# Patient Record
Sex: MALE | Race: BLACK OR AFRICAN AMERICAN | Employment: UNEMPLOYED | URBAN - METROPOLITAN AREA
[De-identification: names, ages, dates, MRNs, and addresses within clinical notes are randomized per-mention and may not be internally consistent; named-entity substitution may affect disease eponyms.]

---

## 2017-07-26 ENCOUNTER — TELEPHONE (OUTPATIENT)
Dept: SLEEP MEDICINE | Facility: CLINIC | Age: 32
End: 2017-07-26

## 2017-07-26 DIAGNOSIS — G47.33 OBSTRUCTIVE SLEEP APNEA: Primary | ICD-10-CM

## 2017-07-26 NOTE — LETTER
7/26/2017        RE: Jarred Daniel Jr.  [address]  [address]  [place] [facility] [address]      Dr. Tubbs,    Patient called because he was seen at [facility] for sleep consult last week and recommended to have a sleep study but would like it done in [facility] system.  Reviewed consult notes from Dr. Tubbs:    ASSESSMENT:  A 31-year-old with anxiety disorder who presents with symptoms of difficulty in falling asleep, staying asleep, non-refreshing sleep, daytime fatigue, and sleepiness, loud snoring, rare episodes of gasping and elevation in the body mass index. His [place] Sleepiness Score is elevated.  1.Chronic insomnia. Patient's symptoms are likely contributed by anxiety, as well as untreated sleep apnea.  2.Obstructive sleep apnea. This is likely in this patient with symptoms, which include difficulty in maintaining sleep, snoring, daytime fatigue and sleepiness and an elevation in his body mass index.  3.Vitamin D deficiency, currently on supplementation, improving.  4.Obesity, which is a risk factor for untreated sleep apnea.    PLAN:  1.I have discussed with him the diagnosis of chronic insomnia and also obstructive sleep apnea.  2.He has an appointment to see Merna Smith [facility] for cognitive behavioral therapy for both anxiety and insomnia.  3.I do feel that patient requires further diagnostic testing consisting of an overnight polysomnogram. This was schedule and patient is aware that he might be placed on the CPAP during the course of the recording.  4.After the sleep study is completed and careful review I will go ahead and prescribe him a CPAP device if he qualifies.  5.He also feels that he is going to make some attempts to lose weight by modifying his diet and exercise routine.     Based on Dr. Tubbs's notes, I have a high suspicion for WASHINGTON based on presence of snoring, snort arousals, witnessed apneas, and obesity with excessive daytime sleepiness.  Per notes, he is interested  in treatment if determined to be medically necessary.  We discussed consequences of untreated WASHINGTON. Patient is interested in treatment and willing to undergo overnight sleep testing.  Home sleep testing is inappropriate for this patient due to severe and chronic insomnia.  Study has been ordered today.      Route study and note to Dr. Daniel Tubbs.    Chico Hurley MD, Sleep Physician  Jul 26, 2017       Sincerely,        Chico Hurley MD

## 2017-07-26 NOTE — TELEPHONE ENCOUNTER
Patient called because he was seen at [facility] for sleep consult last week and recommended to have a sleep study but would like it done in [facility] system.  Reviewed consult notes from Dr. Tubbs:    ASSESSMENT:  A 31-year-old with anxiety disorder who presents with symptoms of difficulty in falling asleep, staying asleep, non-refreshing sleep, daytime fatigue, and sleepiness, loud snoring, rare episodes of gasping and elevation in the body mass index. His [place] Sleepiness Score is elevated.  1.Chronic insomnia. Patient's symptoms are likely contributed by anxiety, as well as untreated sleep apnea.  2.Obstructive sleep apnea. This is likely in this patient with symptoms, which include difficulty in maintaining sleep, snoring, daytime fatigue and sleepiness and an elevation in his body mass index.  3.Vitamin D deficiency, currently on supplementation, improving.  4.Obesity, which is a risk factor for untreated sleep apnea.    PLAN:  1.I have discussed with him the diagnosis of chronic insomnia and also obstructive sleep apnea.  2.He has an appointment to see JULIA Dumas for cognitive behavioral therapy for both anxiety and insomnia.  3.I do feel that patient requires further diagnostic testing consisting of an overnight polysomnogram. This was schedule and patient is aware that he might be placed on the CPAP during the course of the recording.  4.After the sleep study is completed and careful review I will go ahead and prescribe him a CPAP device if he qualifies.  5.He also feels that he is going to make some attempts to lose weight by modifying his diet and exercise routine.     Based on Dr. Tubbs's notes, I have a high suspicion for WASHINGTON based on presence of snoring, snort arousals, witnessed apneas, and obesity with excessive daytime sleepiness.  Per notes, he is interested in treatment if determined to be medically necessary.  We discussed consequences of untreated WASHINGTON. Patient is interested  in treatment and willing to undergo overnight sleep testing.  Home sleep testing is inappropriate for this patient due to severe and chronic insomnia.  Study has been ordered today.      Route study and note to Dr. Daniel Tubbs.    Chico Hurley MD, Sleep Physician  Jul 26, 2017

## 2017-08-10 ENCOUNTER — OFFICE VISIT (OUTPATIENT)
Dept: NURSING | Facility: CLINIC | Age: 32
End: 2017-08-10
Payer: COMMERCIAL

## 2017-08-10 DIAGNOSIS — R06.02 SHORTNESS OF BREATH: ICD-10-CM

## 2017-08-10 DIAGNOSIS — G47.9 SLEEP DISORDER: Primary | ICD-10-CM

## 2017-08-10 LAB
% O2SAT: ABNORMAL % (ref 92–100)
BASE EXCESS BLDA CALC-SCNC: 2 MMOL/L (ref -9–1.8)
FIO2: ABNORMAL
HCO3 BLDA-SCNC: 26.8 MMOL/L (ref 21–28)
PCO2 BLDA: 42.1 MMHG (ref 35–45)
PH BLDA: 7.41 [PH] (ref 7.35–7.45)
PO2 BLD: 82 MMHG (ref 80–105)

## 2017-08-10 PROCEDURE — 36600 WITHDRAWAL OF ARTERIAL BLOOD: CPT | Performed by: INTERNAL MEDICINE

## 2017-08-10 PROCEDURE — 82805 BLOOD GASES W/O2 SATURATION: CPT | Performed by: INTERNAL MEDICINE

## 2017-08-10 NOTE — MR AVS SNAPSHOT
After Visit Summary   8/10/2017    Jarred Daniel Jr.    MRN: [id number]           Patient Information     Date Of Birth          1985        Visit Information        Provider Department      8/10/2017 4:00 PM PFT LAB [facility]        Today's Diagnoses     Sleep disorder    -  1    Shortness of breath           Follow-ups after your visit        Your next 10 appointments already scheduled     Aug 12, 2017  8:00 PM CDT   PSG Split with BK BED 1   [facility] ([facility])    [address] [place] [id number] [phone]              Who to contact     If you have questions or need follow up information about today's clinic visit or your schedule please contact [facility] directly at [phone].  Normal or non-critical lab and imaging results will be communicated to you by MyChart, letter or phone within 4 business days after the clinic has received the results. If you do not hear from us within 7 days, please contact the clinic through MyChart or phone. If you have a critical or abnormal lab result, we will notify you by phone as soon as possible.  Submit refill requests through [facility] or call your pharmacy and they will forward the refill request to us. Please allow 3 business days for your refill to be completed.          Additional Information About Your Visit        SABIAharPlatypi Information     [facility] is an electronic gateway that provides easy, online access to your medical records. With [facility], you can request a clinic appointment, read your test results, renew a prescription or communicate with your care team.     To sign up for [facility] visit the website at [email or web address]   You will be asked to enter the access code listed below, as well as some personal information. Please follow the directions to create your username and password.     Your access code is:  [email or web address]  Expires: 2017  5:04 PM     Your access code will  in 90 days. If you need help or a new code, please contact your [facility] or call [phone] for assistance.        Care EveryWhere ID     This is your Care EveryWhere ID. This could be used by other organizations to access your [place] medical records  [id number]         Blood Pressure from Last 3 Encounters:   13 132/89    Weight from Last 3 Encounters:   13 117.9 kg (260 lb)              We Performed the Following     Blood gas arterial and oxyhgb     General PFT Lab (Please always keep checked)        Primary Care Provider    Physician No Ref-Primary       No address on file        Equal Access to Services     PAULA MARC : Jessica Aguilar, wahernánda laronadaha, qaybta kaalmada adeadalyada, jessica fuchs . So [facility] [phone].    ATENCIÓN: Si habla español, tiene a cobos disposición servicios gratuitos de asistencia lingüística. Llame al [phone].    We comply with applicable federal civil rights laws and [place] laws. We do not discriminate on the basis of race, color, national origin, age, disability sex, sexual orientation or gender identity.            Thank you!     Thank you for choosing [facility]  for your care. Our goal is always to provide you with excellent care. Hearing back from our patients is one way we can continue to improve our services. Please take a few minutes to complete the written survey that you may receive in the mail after your visit with us. Thank you!             Your Updated Medication List - Protect others around you: Learn how to safely use, store and throw away your medicines at www.disposemymeds.org.          This list is accurate as of: 8/10/17  5:04 PM.  Always use your most recent med list.                   Brand Name Dispense Instructions for use Diagnosis    * risperiDONE 0.5 MG tablet     risperDAL    30 tablet    Take 1 tablet by mouth At Bedtime.    Psychosis       * risperiDONE 0.5 MG tablet    risperDAL    30 tablet    Take 1 tablet by mouth daily.    Psychosis       * Notice:  This list has 2 medication(s) that are the same as other medications prescribed for you. Read the directions carefully, and ask your doctor or other care provider to review them with you.

## 2017-08-10 NOTE — PROGRESS NOTES
ABG: Obtained ABG from R Radial after performing a positive Navneet's test. Blood was drawn on RA SPO2 at time of draw 98% RA.    Patient requested spirometry test stating that his PCP was concerned about his breathing. Completed an [facility].  No charge for the [facility].

## 2017-08-11 LAB
EXPTIME-PRE: 6.17 SEC
FEF2575-%PRED-PRE: 76 %
FEF2575-PRE: 3.63 L/SEC
FEF2575-PRED: 4.76 L/SEC
FEFMAX-%PRED-PRE: 94 %
FEFMAX-PRE: 10.2 L/SEC
FEFMAX-PRED: 10.82 L/SEC
FEV1-%PRED-PRE: 95 %
FEV1-PRE: 4.6 L
FEV1FEV6-PRE: 74 %
FEV1FEV6-PRED: 83 %
FEV1FVC-PRE: 74 %
FEV1FVC-PRED: 82 %
FIFMAX-PRE: 6.64 L/SEC
FVC-%PRED-PRE: 106 %
FVC-PRE: 6.25 L
FVC-PRED: 5.88 L

## 2017-08-12 ENCOUNTER — THERAPY VISIT (OUTPATIENT)
Dept: SLEEP MEDICINE | Facility: CLINIC | Age: 32
End: 2017-08-12
Payer: COMMERCIAL

## 2017-08-12 DIAGNOSIS — G47.33 OBSTRUCTIVE SLEEP APNEA: ICD-10-CM

## 2017-08-12 PROCEDURE — 95810 POLYSOM 6/> YRS 4/> PARAM: CPT | Performed by: INTERNAL MEDICINE

## 2017-08-12 NOTE — MR AVS SNAPSHOT
After Visit Summary   8/12/2017    Jarred Daniel Jr.    MRN: [id number]           Patient Information     Date Of Birth          1985        Visit Information        Provider Department      8/12/2017 8:00 PM BK BED 1 [facility]        Today's Diagnoses     Obstructive sleep apnea           Follow-ups after your visit        Who to contact     If you have questions or need follow up information about today's clinic visit or your schedule please contact [facility] directly at [phone].  Normal or non-critical lab and imaging results will be communicated to you by [employer], letter or phone within 4 business days after the clinic has received the results. If you do not hear from us within 7 days, please contact the clinic through [employer] or phone. If you have a critical or abnormal lab result, we will notify you by phone as soon as possible.  Submit refill requests through [facility] or call your pharmacy and they will forward the refill request to us. Please allow 3 business days for your refill to be completed.          Additional Information About Your Visit        MyChart Information     [facility] gives you secure access to your electronic health record. If you see a primary care provider, you can also send messages to your care team and make appointments. If you have questions, please call your primary care clinic.  If you do not have a primary care provider, please call [phone] and they will assist you.        Care EveryWhere ID     This is your Care EveryWhere ID. This could be used by other organizations to access your [place] medical records  [id number]         Blood Pressure from Last 3 Encounters:   04/18/13 132/89    Weight from Last 3 Encounters:   04/18/13 117.9 kg (260 lb)              We Performed the Following     Comprehensive Sleep Study        Primary Care Provider    Physician No Ref-Primary       No address on file        Equal Access to  Services     [facility]: Hadii aad ku hadgómezloy Aguilar, waaxda luqadaha, qaybta kaalmaviky gregory, jessica azul. So [facility] [phone].    ATENCIÓN: Si habla español, tiene a cobos disposición servicios gratuitos de asistencia lingüística. Llame al [phone].    We comply with applicable federal civil rights laws and [place] laws. We do not discriminate on the basis of race, color, national origin, age, disability sex, sexual orientation or gender identity.            Thank you!     Thank you for choosing [facility]  for your care. Our goal is always to provide you with excellent care. Hearing back from our patients is one way we can continue to improve our services. Please take a few minutes to complete the written survey that you may receive in the mail after your visit with us. Thank you!             Your Updated Medication List - Protect others around you: Learn how to safely use, store and throw away your medicines at www.disposemymeds.org.          This list is accurate as of: 8/12/17 11:59 PM.  Always use your most recent med list.                   Brand Name Dispense Instructions for use Diagnosis    * risperiDONE 0.5 MG tablet    risperDAL    30 tablet    Take 1 tablet by mouth At Bedtime.    Psychosis       * risperiDONE 0.5 MG tablet    risperDAL    30 tablet    Take 1 tablet by mouth daily.    Psychosis       * Notice:  This list has 2 medication(s) that are the same as other medications prescribed for you. Read the directions carefully, and ask your doctor or other care provider to review them with you.

## 2017-08-17 NOTE — PROCEDURES
" SLEEP STUDY INTERPRETATION  POLYSOMNOGRAPHY REPORT      Patient: Jarred Daniel  YOB: 1985  Study Date: 8/12/2017  MRN: [id number]  Referring Provider: -  Ordering Provider: Dr. Hurley    Indications for Polysomnography: The patient is a 31 y old Male who is 6' 1\" and weighs 257.6 lbs.  His BMI is 34.1, [place] sleepiness scale 13.0 and neck size is 42.0.  Relevant medical history includes chronic insomnia and obesity. A diagnostic polysomnogram was performed to evaluate for WASHINGTON, hypoventilation and hypoxemia.    Polysomnogram Data:  A full night polysomnogram recorded the standard physiologic parameters including EEG, EOG, EMG, ECG, nasal and oral airflow.  Respiratory parameters of chest and abdominal movements were recorded with respiratory inductance plethysmography.  Oxygen saturation was recorded by pulse oximetry.      Sleep Architecture: Normal sleep latency without sleep aid, increased arousal index, and decreased sleep efficiency.  The total recording time of the polysomnogram was 472.9 minutes.  The total sleep time was 337.0 minutes.  Sleep latency was normal at 13.0 minutes without the use of a sleep aid.  REM latency was 82.5 minutes.  Arousal index was increased at 19.6 arousals per hour.  Sleep efficiency was decreased at 71.3%.  Wake after sleep onset was 122.0 minutes.  The patient spent 12.8% of total sleep time in Stage N1, 52.5% in Stage N2, 12.8% in Stages N3, and 22.0% in REM.  Time in REM supine was 1.0 minutes.    Respiration: Normal PFTs.  Normal ABG.  Light snoring without evidence of significant sleep-disordered breathing.    Events - The polysomnogram revealed a presence of 1 central apnea resulting in an apnea index of 0.2 events per hour.  There were - hypopneas resulting in a hypopnea index of - events per hour.  The combined apnea/hypopnea index was 0.2 events per hour.  The REM AHI was - events per hour.  The supine AHI was - events per hour.  The RERA index " was 0.4 events per hour.   The RDI was 0.6 events per hour.    Snoring - was reported as light.    Respiratory rate and pattern - was normal.    Sustained Sleep Associated Hypoventilation - No evidence of significant hypoventilation.    Sleep Associated Hypoxemia - (Greater than 5 minutes O2 sat below 89%) was not present.  Baseline oxygen saturation was 95.8%. Lowest oxygen saturation was 89.3%.  Time spent less than or equal to 88% was - minutes.  Time spent less than or equal to 89% was - minutes.  0.6 0.4 0.2     Movement Activity: No abnormal behaviors noted.    Periodic Limb Activity - There were - PLMs during the entire study.    REM EMG Activity - Excessive muscle activity was not present.    Nocturnal Behavior - Abnormal sleep related behaviors were not noted.    Bruxism - None apparent.    Cardiac Summary: No arrhythmias noted.  The average pulse rate was 58.7 bpm.  The minimum pulse rate was 48.9 bpm while the maximum pulse rate was 104.1 bpm. The rhythm is normal sinus. Arrhythmias were not noted.        Assessment:     Normal sleep latency without sleep aid, increased arousal index, and decreased sleep efficiency.    Normal PFTs.  Normal ABG.  Light snoring without evidence of significant sleep-disordered breathing.    Recommendations:    Advise regarding the risks of drowsy driving.    Suggest optimizing sleep schedule and avoiding sleep deprivation.    Weight management (if BMI > 30).    Cardiac Comments: Normal Sinus Rhythm  Diagnostic Codes:     Unspecified Sleep Disturbance G47.9     _____________________________________   Electronically Signed By: Chico Hurley MD 8/16/2017

## 2017-08-29 ENCOUNTER — VIRTUAL VISIT (OUTPATIENT)
Dept: SLEEP MEDICINE | Facility: CLINIC | Age: 32
End: 2017-08-29
Payer: COMMERCIAL

## 2017-08-29 ENCOUNTER — TELEPHONE (OUTPATIENT)
Dept: SLEEP MEDICINE | Facility: CLINIC | Age: 32
End: 2017-08-29

## 2017-08-29 DIAGNOSIS — R06.83 SNORING: ICD-10-CM

## 2017-08-29 DIAGNOSIS — G47.00 INSOMNIA, UNSPECIFIED TYPE: Primary | ICD-10-CM

## 2017-08-29 PROCEDURE — 99441 ZZC PHYSICIAN TELEPHONE EVALUATION 5-10 MIN: CPT | Performed by: INTERNAL MEDICINE

## 2017-08-29 NOTE — LETTER
"    8/29/2017         RE: Jarred Daniel Jr.  [address]  [facility] [address]        Dear Colleague,    Thank you for allowing us to participate in the care of your patient, Jarred Daniel Jr. Please see a copy of my visit note below.    Jarred Daniel Jr. is a 31 year old male who is being evaluated via a telephone visit.      The patient has been notified of following:     \"This telephone visit will be conducted via a call between you and your physician/provider. We have found that certain health care needs can be provided without the need for a physical exam.  This service lets us provide the care you need with a short phone conversation.  If a prescription is necessary we can send it directly to your pharmacy.  If lab work is needed we can place an order for that and you can then stop by our lab to have the test done at a later time.    We will bill your insurance company for this service.  Please check with your medical insurance if this type of visit is covered. You may be responsible for the cost of this type of visit if insurance coverage is denied.  The typical cost is $30 (10min), $59 (11-20min) and $85 (21-30min).  Most often these visits are shorter than 10 minutes.    If during the course of the call the physician/provider feels a telephone visit is not appropriate, you will not be charged for this service.\"     Consent has been obtained for this service by 1 care team members: yes. See the scanned image in the medical record.    Jarred Daniel Jr. complains of  Study Results    I have reviewed and updated the patient's Past Medical History, Social History, Family History and Medication List.    ALLERGIES  Penicillins    Elyssa Marsh CMA   (MA signature)    Additional provider notes:   Sleep Study Follow-Up Visit:  Date on this visit: 8/29/2017  Sleep study done on 8/12/2017 at the [facility] for possible sleep apnea.    Sleep latency 13 minutes without " Ambien.  REM achieved.   REM latency 82.5 minutes.  Sleep efficiency 71.3%. Total sleep time 337 minutes with prolonged middle of the night awakening (12:30 - 2 AM).    Sleep architecture:  Stage 1, 12.8% (5%), stage 2, 52.5% (45-55%), stage 3, 12.8% (15-20%), stage REM, 22% (20-25%).  AHI was 0.2. RDI 0.6.  Periodic Limb Movement Index 0/hour.     No arrhythmias, abnormal behaviors, or PLMs.  Sleep architecture was normal outside of prolonged awakening.    These findings were reviewed with patient.     Past medical/surgical history, family history, social history, medications and allergies were reviewed.      Problem List:  Patient Active Problem List    Diagnosis Date Noted     Psychosis 04/16/2013     Priority: Medium        Impression/Plan:  1. Insomnia, unspecified type  Patient is still frustrated by middle of the night awakening.  Discussed that should this persist, he should keep with plan to see insomnia counselor as initially prescribed by Dr. Tubbs.    2. Snoring  No significant sleep-disordered breathing (no obstructive events all together).  Lack of supine sleep time raises ambiguity somewhat but unlikely to have significant disease overall.     I have reviewed the note as documented above.  This accurately captures the substance of my conversation with the patient,  Jarred Daniel Jr.     Total time of call between patient and provider was 6 minutes         Again, thank you for allowing me to participate in the care of your patient.  Copies of the sleep study will follow via fax.      Sincerely,        Chico Hurley MD

## 2017-08-29 NOTE — MR AVS SNAPSHOT
After Visit Summary   8/29/2017    Jarred Daniel Jr.    MRN: [id number]           Patient Information     Date Of Birth          1985        Visit Information        Provider Department      8/29/2017 4:15 PM Chico Hurley MD [facility]        Today's Diagnoses     Insomnia, unspecified type    -  1    Snoring           Follow-ups after your visit        Who to contact     If you have questions or need follow up information about today's clinic visit or your schedule please contact [facility] directly at [phone].  Normal or non-critical lab and imaging results will be communicated to you by [employer], letter or phone within 4 business days after the clinic has received the results. If you do not hear from us within 7 days, please contact the clinic through [employer] or phone. If you have a critical or abnormal lab result, we will notify you by phone as soon as possible.  Submit refill requests through [facility] or call your pharmacy and they will forward the refill request to us. Please allow 3 business days for your refill to be completed.          Additional Information About Your Visit        MyChart Information     [facility] gives you secure access to your electronic health record. If you see a primary care provider, you can also send messages to your care team and make appointments. If you have questions, please call your primary care clinic.  If you do not have a primary care provider, please call [phone] and they will assist you.        Care EveryWhere ID     This is your Care EveryWhere ID. This could be used by other organizations to access your [place] medical records  [id number]         Blood Pressure from Last 3 Encounters:   04/18/13 132/89    Weight from Last 3 Encounters:   04/18/13 117.9 kg (260 lb)              Today, you had the following     No orders found for display       Primary Care Provider    Physician No Ref-Primary        No address on file        Equal Access to Services     [facility] JENNIFERKATIE : Hadii aad kirill latricia Aguilar, wahernánda luqcharles, qaybsergio kaalmisty bri, waxtomás alexandra lehmantommiekaran azul. So [facility] [phone].    ATENCIÓN: Si habla español, tiene a coobs disposición servicios gratuitos de asistencia lingüística. [facility] [phone].    We comply with applicable federal civil rights laws and [place] laws. We do not discriminate on the basis of race, color, national origin, age, disability sex, sexual orientation or gender identity.            Thank you!     Thank you for choosing [facility]  for your care. Our goal is always to provide you with excellent care. Hearing back from our patients is one way we can continue to improve our services. Please take a few minutes to complete the written survey that you may receive in the mail after your visit with us. Thank you!             Your Updated Medication List - Protect others around you: Learn how to safely use, store and throw away your medicines at www.disposemymeds.org.          This list is accurate as of: 8/29/17  4:34 PM.  Always use your most recent med list.                   Brand Name Dispense Instructions for use Diagnosis    * risperiDONE 0.5 MG tablet    risperDAL    30 tablet    Take 1 tablet by mouth At Bedtime.    Psychosis       * risperiDONE 0.5 MG tablet    risperDAL    30 tablet    Take 1 tablet by mouth daily.    Psychosis       * Notice:  This list has 2 medication(s) that are the same as other medications prescribed for you. Read the directions carefully, and ask your doctor or other care provider to review them with you.

## 2017-08-30 NOTE — PROGRESS NOTES
Faxed Sleep study to Daniel Tubbs and Azucena Garduno @ [phone]. Also  Mailed study to patient's home address.

## 2019-09-29 ENCOUNTER — HEALTH MAINTENANCE LETTER (OUTPATIENT)
Age: 34
End: 2019-09-29

## 2021-01-14 ENCOUNTER — HEALTH MAINTENANCE LETTER (OUTPATIENT)
Age: 36
End: 2021-01-14

## 2021-10-24 ENCOUNTER — HEALTH MAINTENANCE LETTER (OUTPATIENT)
Age: 36
End: 2021-10-24

## 2022-02-13 ENCOUNTER — HEALTH MAINTENANCE LETTER (OUTPATIENT)
Age: 37
End: 2022-02-13

## 2022-10-15 ENCOUNTER — HEALTH MAINTENANCE LETTER (OUTPATIENT)
Age: 37
End: 2022-10-15

## 2022-12-09 NOTE — PROGRESS NOTES
"Jarred Daniel Jr. is a 31 year old male who is being evaluated via a telephone visit.      The patient has been notified of following:     \"This telephone visit will be conducted via a call between you and your physician/provider. We have found that certain health care needs can be provided without the need for a physical exam.  This service lets us provide the care you need with a short phone conversation.  If a prescription is necessary we can send it directly to your pharmacy.  If lab work is needed we can place an order for that and you can then stop by our lab to have the test done at a later time.    We will bill your insurance company for this service.  Please check with your medical insurance if this type of visit is covered. You may be responsible for the cost of this type of visit if insurance coverage is denied.  The typical cost is $30 (10min), $59 (11-20min) and $85 (21-30min).  Most often these visits are shorter than 10 minutes.    If during the course of the call the physician/provider feels a telephone visit is not appropriate, you will not be charged for this service.\"     Consent has been obtained for this service by 1 care team members: yes. See the scanned image in the medical record.    Jarred Daniel Jr. complains of  Study Results    I have reviewed and updated the patient's Past Medical History, Social History, Family History and Medication List.    ALLERGIES  Penicillins    Elyssa Marsh CMA   (MA signature)    Additional provider notes:   Sleep Study Follow-Up Visit:  Date on this visit: 8/29/2017  Sleep study done on 8/12/2017 at the [facility] for possible sleep apnea.    Sleep latency 13 minutes without Ambien.  REM achieved.   REM latency 82.5 minutes.  Sleep efficiency 71.3%. Total sleep time 337 minutes with prolonged middle of the night awakening (12:30 - 2 AM).    Sleep architecture:  Stage 1, 12.8% (5%), stage 2, 52.5% (45-55%), stage 3, 12.8% " (15-20%), stage REM, 22% (20-25%).  AHI was 0.2. RDI 0.6.  Periodic Limb Movement Index 0/hour.     No arrhythmias, abnormal behaviors, or PLMs.  Sleep architecture was normal outside of prolonged awakening.    These findings were reviewed with patient.     Past medical/surgical history, family history, social history, medications and allergies were reviewed.      Problem List:  Patient Active Problem List    Diagnosis Date Noted     Psychosis 04/16/2013     Priority: Medium        Impression/Plan:  1. Insomnia, unspecified type  Patient is still frustrated by middle of the night awakening.  Discussed that should this persist, he should keep with plan to see insomnia counselor as initially prescribed by Dr. Tubbs.    2. Snoring  No significant sleep-disordered breathing (no obstructive events all together).  Lack of supine sleep time raises ambiguity somewhat but unlikely to have significant disease overall.     I have reviewed the note as documented above.  This accurately captures the substance of my conversation with the patient,  Jarred Daniel Jr.     Total time of call between patient and provider was 6 minutes        independent

## 2023-03-26 ENCOUNTER — HEALTH MAINTENANCE LETTER (OUTPATIENT)
Age: 38
End: 2023-03-26